# Patient Record
Sex: MALE | Race: BLACK OR AFRICAN AMERICAN | NOT HISPANIC OR LATINO | Employment: FULL TIME | URBAN - METROPOLITAN AREA
[De-identification: names, ages, dates, MRNs, and addresses within clinical notes are randomized per-mention and may not be internally consistent; named-entity substitution may affect disease eponyms.]

---

## 2017-10-10 ENCOUNTER — OFFICE VISIT (OUTPATIENT)
Dept: URGENT CARE | Facility: CLINIC | Age: 37
End: 2017-10-10
Payer: COMMERCIAL

## 2017-10-10 VITALS
TEMPERATURE: 98.8 F | HEART RATE: 82 BPM | DIASTOLIC BLOOD PRESSURE: 88 MMHG | BODY MASS INDEX: 28.06 KG/M2 | WEIGHT: 207.2 LBS | HEIGHT: 72 IN | RESPIRATION RATE: 20 BRPM | SYSTOLIC BLOOD PRESSURE: 126 MMHG | OXYGEN SATURATION: 96 %

## 2017-10-10 DIAGNOSIS — J02.9 ACUTE VIRAL PHARYNGITIS: ICD-10-CM

## 2017-10-10 LAB
INT CON NEG: NEGATIVE
INT CON POS: POSITIVE
S PYO AG THROAT QL: NEGATIVE

## 2017-10-10 PROCEDURE — 99213 OFFICE O/P EST LOW 20 MIN: CPT | Performed by: PHYSICIAN ASSISTANT

## 2017-10-10 PROCEDURE — 87880 STREP A ASSAY W/OPTIC: CPT | Performed by: PHYSICIAN ASSISTANT

## 2017-10-10 NOTE — PROGRESS NOTES
Chief Complaint   Patient presents with   • Pharyngitis     progressed over the last few hours       HISTORY OF PRESENT ILLNESS: Patient is a 37 y.o. male who presents today for about 8 hours of sore throat, worse in the last 2 hours.    He started having some runny nose/nasal congestion.  He had a bit of cough yesterday. Woke up with sore throat that progressed.  Both sides of throat hurting, all the way down.  Felt aching and slightly feverish. He has not taken anything, been trying to drink water.  No one sick at home.      There are no active problems to display for this patient.      Allergies:Review of patient's allergies indicates no known allergies.    No current "[facility]"-ordered outpatient prescriptions on file.     No current "[facility] [facility]"-ordered facility-administered medications on file.        Past Medical History:   Diagnosis Date   • Hypertension        Social History   Substance Use Topics   • Smoking status: Never Smoker   • Smokeless tobacco: Never Used   • Alcohol use 0.6 oz/week     1 Glasses of wine per week      Comment: once a week       No family status information on file.   History reviewed. No pertinent family history.    ROS:  Review of Systems   Constitutional: SEE HPI  HENT: SEE HPI  Eyes: Negative for blurred vision.   Respiratory: SEE HPI  Cardiovascular: Negative for chest pain, palpitations, orthopnea and leg swelling.   Gastrointestinal: Negative for heartburn, nausea, vomiting and abdominal pain.   All other systems reviewed and are negative.       Exam:  Blood pressure 126/88, pulse 82, temperature 37.1 °C (98.8 °F), resp. rate 20, height 1.829 m (6'), weight 94 kg (207 lb 3.2 oz), SpO2 96 %.  General:  Well nourished, well developed male in NAD  Eyes: PERRLA, EOM within normal limits, no conjunctival injection, no scleral icterus, visual fields and acuity grossly intact.  Ears: Normal shape and symmetry, no tenderness, no discharge. External canals are without any significant edema or  erythema. Tympanic membranes are without any inflammation, no effusion. Gross auditory acuity is intact  Nose: Symmetrical, sinuses without tenderness, no discharge.   Mouth: reasonable hygiene, mild diffuse erythema without exudates or tonsillar enlargement.  Neck: no masses, range of motion within normal limits, no tracheal deviation. No lymphadenopathy  Pulmonary: Normal respiratory effort, no wheezes, crackles, or rhonchi.  Cardiovascular: regular rate and rhythm without murmurs, rubs, or gallops.  Abdomen: Soft, nontender, nondistended. Normal bowel sounds. No hepatosplenomegaly or masses, or hernias. No rebound or guarding.  Skin: No visible rashes or lesion. Warm, pink, dry.   Extremities: no clubbing, cyanosis, or edema.  Neuro: A&O x 3. Speech normal/clear.  Normal gait.         Assessment/Plan:  1. Acute viral pharyngitis  POCT Rapid Strep A       -strep negative.   -discussed that I felt this was viral in nature. Did not see any evidence of a bacterial process. Discussed natural progression and sx care.  -fluids, rest emphasized.  -salt gargles, Tylenol/Motrin alternating for pain/fevers.   -humidifier/steam inhalations.      Supportive care, differential diagnoses, and indications for immediate follow-up discussed with patient.   Pathogenesis of diagnosis discussed including typical length and natural progression.   Instructed to return to clinic or nearest emergency department for any change in condition, further concerns, or worsening of symptoms.  Patient states understanding of the plan of care and discharge instructions.      Sally Dick P.A.-C.

## 2019-11-27 ENCOUNTER — OFFICE VISIT (OUTPATIENT)
Dept: URGENT CARE | Facility: PHYSICIAN GROUP | Age: 39
End: 2019-11-27
Payer: COMMERCIAL

## 2019-11-27 ENCOUNTER — APPOINTMENT (OUTPATIENT)
Dept: RADIOLOGY | Facility: IMAGING CENTER | Age: 39
End: 2019-11-27
Attending: NURSE PRACTITIONER
Payer: COMMERCIAL

## 2019-11-27 VITALS
BODY MASS INDEX: 27.96 KG/M2 | SYSTOLIC BLOOD PRESSURE: 110 MMHG | HEART RATE: 72 BPM | TEMPERATURE: 98.9 F | DIASTOLIC BLOOD PRESSURE: 70 MMHG | OXYGEN SATURATION: 96 % | WEIGHT: 206.4 LBS | RESPIRATION RATE: 16 BRPM | HEIGHT: 72 IN

## 2019-11-27 DIAGNOSIS — R05.9 COUGH: ICD-10-CM

## 2019-11-27 DIAGNOSIS — J40 BRONCHITIS: ICD-10-CM

## 2019-11-27 PROCEDURE — 94760 N-INVAS EAR/PLS OXIMETRY 1: CPT | Performed by: NURSE PRACTITIONER

## 2019-11-27 PROCEDURE — 99214 OFFICE O/P EST MOD 30 MIN: CPT | Mod: 25 | Performed by: NURSE PRACTITIONER

## 2019-11-27 PROCEDURE — 71046 X-RAY EXAM CHEST 2 VIEWS: CPT | Mod: TC | Performed by: NURSE PRACTITIONER

## 2019-11-27 PROCEDURE — 94640 AIRWAY INHALATION TREATMENT: CPT | Performed by: NURSE PRACTITIONER

## 2019-11-27 RX ORDER — ALBUTEROL SULFATE 90 UG/1
2 AEROSOL, METERED RESPIRATORY (INHALATION) EVERY 6 HOURS PRN
Qty: 8.5 G | Refills: 0 | Status: SHIPPED | OUTPATIENT
Start: 2019-11-27

## 2019-11-27 RX ORDER — ALBUTEROL SULFATE 2.5 MG/3ML
2.5 SOLUTION RESPIRATORY (INHALATION) ONCE
Status: COMPLETED | OUTPATIENT
Start: 2019-11-27 | End: 2019-11-27

## 2019-11-27 RX ORDER — BENZONATATE 200 MG/1
200 CAPSULE ORAL 3 TIMES DAILY PRN
Qty: 60 CAP | Refills: 0 | Status: SHIPPED | OUTPATIENT
Start: 2019-11-27

## 2019-11-27 RX ORDER — AZITHROMYCIN 250 MG/1
TABLET, FILM COATED ORAL
Qty: 6 TAB | Refills: 0 | Status: SHIPPED | OUTPATIENT
Start: 2019-11-27

## 2019-11-27 RX ADMIN — ALBUTEROL SULFATE 2.5 MG: 2.5 SOLUTION RESPIRATORY (INHALATION) at 11:55

## 2019-11-27 ASSESSMENT — ENCOUNTER SYMPTOMS
COUGH: 1
CHILLS: 1
WHEEZING: 1
SPUTUM PRODUCTION: 1
SHORTNESS OF BREATH: 1

## 2019-11-27 NOTE — PROGRESS NOTES
Subjective:      Bernardo Aponte is a 39 y.o. male who presents with Cough (was in urgent care 10 days ago in [place] was given steroids, still not feeling better, night sweats, shortness of breath, tested negative for flu, x21 days )    Past Medical History:   Diagnosis Date   • Hypertension      Social History     Socioeconomic History   • Marital status:      Spouse name: Not on file   • Number of children: Not on file   • Years of education: Not on file   • Highest education level: Not on file   Occupational History   • Not on file   Social Needs   • Financial resource strain: Not on file   • Food insecurity:     Worry: Not on file     Inability: Not on file   • Transportation needs:     Medical: Not on file     Non-medical: Not on file   Tobacco Use   • Smoking status: Never Smoker   • Smokeless tobacco: Never Used   Substance and Sexual Activity   • Alcohol use: Yes     Alcohol/week: 0.6 oz     Types: 1 Glasses of wine per week     Comment: once a week   • Drug use: No   • Sexual activity: Not on file   Lifestyle   • Physical activity:     Days per week: Not on file     Minutes per session: Not on file   • Stress: Not on file   Relationships   • Social connections:     Talks on phone: Not on file     Gets together: Not on file     Attends Religion service: Not on file     Active member of club or organization: Not on file     Attends meetings of clubs or organizations: Not on file     Relationship status: Not on file   • Intimate partner violence:     Fear of current or ex partner: Not on file     Emotionally abused: Not on file     Physically abused: Not on file     Forced sexual activity: Not on file   Other Topics Concern   • Not on file   Social History Narrative   • Not on file     No family history on file.    Allergies: Patient has no known allergies.    Patient is 39-year-old male who presented with complaint of cough and shortness of breath.  His symptoms have been ongoing for the last 3 weeks.   Was seen at an urgent care in [place] and was given prescription for prednisone which she states was not helpful.  Denies fever.        HPI    Review of Systems   Constitutional: Positive for chills and malaise/fatigue.   HENT: Positive for congestion.    Respiratory: Positive for cough, sputum production, shortness of breath and wheezing.    Skin: Negative.    All other systems reviewed and are negative.         Objective:     /70 (BP Location: Left arm, Patient Position: Sitting, BP Cuff Size: Adult)   Pulse 72   Temp 37.2 °C (98.9 °F) (Temporal)   Resp 16   Ht 1.829 m (6')   Wt 93.6 kg (206 lb 6.4 oz)   SpO2 96%   BMI 27.99 kg/m²      Physical Exam  Vitals signs and nursing note reviewed.   Constitutional:       Appearance: Normal appearance.   HENT:      Head: Normocephalic.      Right Ear: Tympanic membrane, ear canal and external ear normal.      Left Ear: Tympanic membrane, ear canal and external ear normal.      Nose: Nose normal.      Mouth/Throat:      Mouth: Mucous membranes are moist.   Eyes:      Extraocular Movements: Extraocular movements intact.      Conjunctiva/sclera: Conjunctivae normal.      Pupils: Pupils are equal, round, and reactive to light.   Neck:      Musculoskeletal: Normal range of motion and neck supple.   Cardiovascular:      Rate and Rhythm: Normal rate and regular rhythm.   Pulmonary:      Effort: Pulmonary effort is normal.      Breath sounds: Rhonchi present.      Comments: Few scattered rhonchi  Musculoskeletal: Normal range of motion.   Skin:     General: Skin is warm and dry.      Capillary Refill: Capillary refill takes less than 2 seconds.   Neurological:      Mental Status: He is alert and oriented to person, place, and time.   Psychiatric:         Mood and Affect: Mood normal.         Behavior: Behavior normal.         Thought Content: Thought content normal.         Judgment: Judgment normal.               11/27/2019 11:59 AM     HISTORY/REASON FOR EXAM:   Cough.  Cough and shortness of breath for 3 weeks     TECHNIQUE/EXAM DESCRIPTION AND NUMBER OF VIEWS:  Two views of the chest.     COMPARISON:  None.     FINDINGS:  The soft tissues and bony structures are unremarkable.     The heart and mediastinal structures are within normal limits.     Pulmonary vascularity is normal.     The lung fields are clear.     There is no effusion or pneumothorax.     IMPRESSION:     No acute cardiopulmonary disease evident.  Assessment/Plan:     Bronchitis, unresolved    Albuterol by hand-held nebulizer given in office  Albuterol inhaler  Prednisone  Zithromax  Follow-up with primary care provider  ER precautions for respiratory distress  There are no diagnoses linked to this encounter.

## 2021-11-07 ENCOUNTER — OFFICE VISIT (OUTPATIENT)
Dept: URGENT CARE | Facility: PHYSICIAN GROUP | Age: 41
End: 2021-11-07
Payer: COMMERCIAL

## 2021-11-07 VITALS
RESPIRATION RATE: 18 BRPM | OXYGEN SATURATION: 98 % | WEIGHT: 210 LBS | HEART RATE: 88 BPM | HEIGHT: 72 IN | TEMPERATURE: 99.5 F | BODY MASS INDEX: 28.44 KG/M2 | SYSTOLIC BLOOD PRESSURE: 128 MMHG | DIASTOLIC BLOOD PRESSURE: 72 MMHG

## 2021-11-07 DIAGNOSIS — S61.311D LACERATION OF LEFT INDEX FINGER WITHOUT FOREIGN BODY WITH DAMAGE TO NAIL, SUBSEQUENT ENCOUNTER: ICD-10-CM

## 2021-11-07 DIAGNOSIS — Z48.02 VISIT FOR SUTURE REMOVAL: ICD-10-CM

## 2021-11-07 PROCEDURE — 99212 OFFICE O/P EST SF 10 MIN: CPT | Performed by: NURSE PRACTITIONER

## 2021-11-07 ASSESSMENT — ENCOUNTER SYMPTOMS
CONSTITUTIONAL NEGATIVE: 1
MUSCULOSKELETAL NEGATIVE: 1
FEVER: 0
NEUROLOGICAL NEGATIVE: 1

## 2021-11-07 ASSESSMENT — VISUAL ACUITY: OU: 1

## 2021-11-07 NOTE — PROGRESS NOTES
Subjective:     Bernardo Aponte is a 41 y.o. male who presents for Suture / Staple Removal (left hand,index finger,2 stitches,x16 days.)       Suture / Staple Removal  There has been no drainage from the wound. There is no redness present. There is no swelling present. There is no pain present.      Patient reports that about 14 days ago, he went to [facility] and had laceration of the left index finger repaired with sutures there.  A combination of nylon sutures and absorbable sutures were used.  Laceration involved the lateral nail fold and nail bed through the nail plate.  Followed up there a few days ago and had the nylon sutures removed.  Comes into urgent care today for removal of the absorbable sutures that holds the nail plate and nailbed together.  Has been performing wound care.  CMS intact.  Denies other/new symptoms.    Patient was screened prior to rooming and denied COVID-19 diagnosis or contact with a person who has been diagnosed or is suspected to have COVID-19. During this visit, appropriate PPE was worn, hand hygiene was performed, and the patient and any visitors were masked.     PMH:  has a past medical history of Hypertension.    MEDS:   Current Outpatient Medications:   •  albuterol 108 (90 Base) MCG/ACT Aero Soln inhalation aerosol, Inhale 2 Puffs by mouth every 6 hours as needed for Shortness of Breath. (Patient not taking: Reported on 11/7/2021), Disp: 8.5 g, Rfl: 0  •  azithromycin (ZITHROMAX) 250 MG Tab, Take 2 pills by mouth on day one, take 1 pill by mouth on days 2-5 (Patient not taking: Reported on 11/7/2021), Disp: 6 Tab, Rfl: 0  •  benzonatate (TESSALON) 200 MG capsule, Take 1 Cap by mouth 3 times a day as needed for Cough. (Patient not taking: Reported on 11/7/2021), Disp: 60 Cap, Rfl: 0    ALLERGIES: No Known Allergies    SURGHX:   Past Surgical History:   Procedure Laterality Date   • [id number]       SOCHX:  reports that he has never smoked. He has never used smokeless  tobacco. He reports current alcohol use of about 0.6 oz of alcohol per week. He reports that he does not use drugs.     FH: Reviewed with patient, not pertinent to this visit.    Review of Systems   Constitutional: Negative.  Negative for fever.   Musculoskeletal: Negative.    Skin:        Left index finger laceration with sutures in place   Neurological: Negative.    All other systems reviewed and are negative.    Additional details per HPI.      Objective:     /72 (BP Location: Right arm, Patient Position: Sitting, BP Cuff Size: Adult)   Pulse 88   Temp 37.5 °C (99.5 °F) (Temporal)   Resp 18   Ht 1.829 m (6')   Wt 95.3 kg (210 lb)   SpO2 98%   BMI 28.48 kg/m²     Physical Exam  Vitals reviewed.   Constitutional:       General: He is not in acute distress.     Appearance: He is well-developed. He is not ill-appearing or toxic-appearing.   HENT:      Right Ear: External ear normal.      Left Ear: External ear normal.   Eyes:      General: Vision grossly intact.      Extraocular Movements: Extraocular movements intact.      Conjunctiva/sclera: Conjunctivae normal.   Cardiovascular:      Rate and Rhythm: Normal rate.   Pulmonary:      Effort: Pulmonary effort is normal. No respiratory distress.   Musculoskeletal:         General: No deformity. Normal range of motion.      Left hand: Laceration present. No swelling, deformity or tenderness. Normal range of motion. Normal strength. Normal sensation. Normal capillary refill.      Cervical back: Normal range of motion.      Comments: Approx 2 cm laceration involving lateral nail fold of left index finger, nail plate, and nailbed, healing well, wound edges well approximated, 2 absorbable sutures in place, no surrounding erythema, edema, warmth, or discharge, CMS intact   Skin:     General: Skin is warm and dry.      Capillary Refill: Capillary refill takes less than 2 seconds.      Coloration: Skin is not pale.      Findings: No bruising or erythema.    Neurological:      Mental Status: He is alert and oriented to person, place, and time.      Sensory: No sensory deficit.      Motor: No weakness.   Psychiatric:         Behavior: Behavior normal. Behavior is cooperative.       Assessment/Plan:     1. Visit for suture removal    2. Laceration of left index finger without foreign body with damage to nail, subsequent encounter    Wound care performed.  A total of 2 interrupted, absorbable sutures were removed.  Patient tolerated well.  No complications.    Advised to continue with basic wound care until laceration fully heals.    Differential diagnosis, natural history, supportive care, over-the-counter symptom management per 's instructions, close monitoring, and indications for immediate follow-up discussed.     All questions answered. Patient agrees with the plan of care.

## 2022-01-19 ENCOUNTER — HOSPITAL ENCOUNTER (OUTPATIENT)
Facility: MEDICAL CENTER | Age: 42
End: 2022-01-19
Attending: STUDENT IN AN ORGANIZED HEALTH CARE EDUCATION/TRAINING PROGRAM
Payer: COMMERCIAL

## 2022-01-19 ENCOUNTER — OFFICE VISIT (OUTPATIENT)
Dept: URGENT CARE | Facility: CLINIC | Age: 42
End: 2022-01-19
Payer: COMMERCIAL

## 2022-01-19 VITALS
WEIGHT: 203 LBS | HEIGHT: 72 IN | HEART RATE: 104 BPM | OXYGEN SATURATION: 96 % | BODY MASS INDEX: 27.5 KG/M2 | RESPIRATION RATE: 18 BRPM | SYSTOLIC BLOOD PRESSURE: 134 MMHG | DIASTOLIC BLOOD PRESSURE: 68 MMHG | TEMPERATURE: 99.3 F

## 2022-01-19 DIAGNOSIS — Z20.822 COVID-19 RULED OUT: ICD-10-CM

## 2022-01-19 DIAGNOSIS — R11.0 NAUSEA: ICD-10-CM

## 2022-01-19 DIAGNOSIS — J01.90 ACUTE BACTERIAL RHINOSINUSITIS: ICD-10-CM

## 2022-01-19 DIAGNOSIS — B96.89 ACUTE BACTERIAL RHINOSINUSITIS: ICD-10-CM

## 2022-01-19 PROCEDURE — U0005 INFEC AGEN DETEC AMPLI PROBE: HCPCS

## 2022-01-19 PROCEDURE — U0003 INFECTIOUS AGENT DETECTION BY NUCLEIC ACID (DNA OR RNA); SEVERE ACUTE RESPIRATORY SYNDROME CORONAVIRUS 2 (SARS-COV-2) (CORONAVIRUS DISEASE [COVID-19]), AMPLIFIED PROBE TECHNIQUE, MAKING USE OF HIGH THROUGHPUT TECHNOLOGIES AS DESCRIBED BY CMS-2020-01-R: HCPCS

## 2022-01-19 PROCEDURE — 99214 OFFICE O/P EST MOD 30 MIN: CPT | Mod: CS | Performed by: STUDENT IN AN ORGANIZED HEALTH CARE EDUCATION/TRAINING PROGRAM

## 2022-01-19 RX ORDER — ONDANSETRON 4 MG/1
4 TABLET, ORALLY DISINTEGRATING ORAL EVERY 6 HOURS PRN
Qty: 20 TABLET | Refills: 0 | Status: SHIPPED | OUTPATIENT
Start: 2022-01-19

## 2022-01-19 RX ORDER — AMOXICILLIN AND CLAVULANATE POTASSIUM 875; 125 MG/1; MG/1
1 TABLET, FILM COATED ORAL 2 TIMES DAILY
Qty: 10 TABLET | Refills: 0 | Status: SHIPPED | OUTPATIENT
Start: 2022-01-19 | End: 2022-01-24

## 2022-01-19 NOTE — PROGRESS NOTES
Subjective:   CHIEF COMPLAINT  Chief Complaint   Patient presents with   • Coronavirus Screening     x today positive home covid test, sinus infection, body aches, fatigue, dry cough, stomach pain, dizziness       HPI  Bernardo Aponte is a 41 y.o. male who presents requesting a COVID PCR test.  Patient with positive home test today.  Patient reports he has been experiencing sinus pain and pressure for approximately 1 month.  Then yesterday he developed fatigue, nausea and dry cough with positive home test.  Patient also reports he has been experiencing ear congestion with a sensation of feeling like he is underwater.  He has tried Mucinex D which is helped.  He is also tried Robitussin.  He denies any associated symptoms of wheezing or shortness of breath.  Patient is vaccinated against COVID x3.    REVIEW OF SYSTEMS  General: no fever or chills  GI: no nausea or vomiting  See HPI for further details.     PAST MEDICAL HISTORY  There are no problems to display for this patient.      SURGICAL HISTORY   has a past surgical history that includes [id number].    ALLERGIES  No Known Allergies    CURRENT MEDICATIONS  Home Medications     Reviewed by Jace Falcon D.O. (Physician) on 01/19/22 at 1325  Med List Status: <None>   Medication Last Dose Status   albuterol 108 (90 Base) MCG/ACT Aero Soln inhalation aerosol Not Taking Active   azithromycin (ZITHROMAX) 250 MG Tab Not Taking Active   benzonatate (TESSALON) 200 MG capsule Not Taking Active                SOCIAL HISTORY  Social History     Tobacco Use   • Smoking status: Never Smoker   • Smokeless tobacco: Never Used   Vaping Use   • Vaping Use: Never used   Substance and Sexual Activity   • Alcohol use: Not Currently     Alcohol/week: 0.6 oz     Types: 1 Glasses of wine per week     Comment: once a week   • Drug use: No   • Sexual activity: Not on file       FAMILY HISTORY  History reviewed. No pertinent family history.       Objective:   PHYSICAL EXAM  VITAL SIGNS:  /68   Pulse (!) 104   Temp 37.4 °C (99.3 °F) (Temporal)   Resp 18   Ht 1.829 m (6')   Wt 92.1 kg (203 lb)   SpO2 96%   BMI 27.53 kg/m²     Gen: no acute distress, normal voice  Skin: dry, intact, moist mucosal membranes  ENT: Mildly erythematous right TM external canal without any debris or edema.  Left TM intact without any erythema or bulging.  Lungs: CTAB w/ symmetric expansion  CV: RRR w/o murmurs or clicks  Psych: normal affect, normal judgement, alert, awake    Assessment/Plan:     1. Acute bacterial rhinosinusitis  amoxicillin-clavulanate (AUGMENTIN) 875-125 MG Tab   2. COVID-19 ruled out  COVID/SARS CoV-2 PCR   3. Nausea  ondansetron (ZOFRAN ODT) 4 MG TABLET DISPERSIBLE   Sinus pain and pressure for 1 month consistent with a bacterial sinus infection.  Then yesterday patient developed nausea, dry cough with home positive test consistent with a superimposed COVID-19 infection.  Patient is vaccinated x3  - Ordered Augmentin for sinus infection (symptoms preceded COVID infection)  - Ordered Rx for Zofran to help with nausea  - NeilMed sinus rinses, Flonase and Zyrtec  -Ordered Covid.  Results will be sent through [employer]  -Instructed to quarantine until Covid results have been returned  -Encouraged hydration and symptomatic treatment with Tylenol/ibuprofen prn  -Discussed red flags and return precautions.  Patient verbalized their understanding.  All questions were answered.    Differential diagnosis, natural history, supportive care, and indications for immediate follow-up discussed. Patient agrees with the plan of care.    Follow-up as needed if symptoms worsen or fail to improve to PCP, Urgent care or Emergency Room.       Please note that this dictation was created using voice recognition software. I have made a reasonable attempt to correct obvious errors, but I expect that there are errors of grammar and possibly content that I did not discover before finalizing the note.

## 2022-01-20 DIAGNOSIS — Z20.822 COVID-19 RULED OUT: ICD-10-CM

## 2022-01-20 LAB — COVID ORDER STATUS COVID19: NORMAL

## 2022-01-21 LAB
SARS-COV-2 RNA RESP QL NAA+PROBE: DETECTED
SPECIMEN SOURCE: ABNORMAL

## 2024-07-19 ENCOUNTER — HOSPITAL ENCOUNTER (EMERGENCY)
Facility: MEDICAL CENTER | Age: 44
End: 2024-07-19
Attending: EMERGENCY MEDICINE
Payer: COMMERCIAL

## 2024-07-19 ENCOUNTER — APPOINTMENT (OUTPATIENT)
Dept: RADIOLOGY | Facility: MEDICAL CENTER | Age: 44
End: 2024-07-19
Attending: EMERGENCY MEDICINE
Payer: COMMERCIAL

## 2024-07-19 VITALS
HEART RATE: 68 BPM | SYSTOLIC BLOOD PRESSURE: 130 MMHG | OXYGEN SATURATION: 93 % | TEMPERATURE: 97.8 F | RESPIRATION RATE: 16 BRPM | HEIGHT: 72 IN | BODY MASS INDEX: 29.47 KG/M2 | DIASTOLIC BLOOD PRESSURE: 98 MMHG | WEIGHT: 217.59 LBS

## 2024-07-19 DIAGNOSIS — Z86.79 HISTORY OF HYPERTENSION: ICD-10-CM

## 2024-07-19 DIAGNOSIS — R07.89 ATYPICAL CHEST PAIN: ICD-10-CM

## 2024-07-19 LAB
ALBUMIN SERPL BCP-MCNC: 4.6 G/DL (ref 3.2–4.9)
ALBUMIN/GLOB SERPL: 1.7 G/DL
ALP SERPL-CCNC: 56 U/L (ref 30–99)
ALT SERPL-CCNC: 36 U/L (ref 2–50)
ANION GAP SERPL CALC-SCNC: 13 MMOL/L (ref 7–16)
AST SERPL-CCNC: 28 U/L (ref 12–45)
BASOPHILS # BLD AUTO: 0.6 % (ref 0–1.8)
BASOPHILS # BLD: 0.04 K/UL (ref 0–0.12)
BILIRUB SERPL-MCNC: 0.6 MG/DL (ref 0.1–1.5)
BUN SERPL-MCNC: 14 MG/DL (ref 8–22)
CALCIUM ALBUM COR SERPL-MCNC: 9.7 MG/DL (ref 8.5–10.5)
CALCIUM SERPL-MCNC: 10.2 MG/DL (ref 8.5–10.5)
CHLORIDE SERPL-SCNC: 103 MMOL/L (ref 96–112)
CO2 SERPL-SCNC: 23 MMOL/L (ref 20–33)
CREAT SERPL-MCNC: 0.98 MG/DL (ref 0.5–1.4)
EKG IMPRESSION: NORMAL
EOSINOPHIL # BLD AUTO: 0.16 K/UL (ref 0–0.51)
EOSINOPHIL NFR BLD: 2.4 % (ref 0–6.9)
ERYTHROCYTE [DISTWIDTH] IN BLOOD BY AUTOMATED COUNT: 40.2 FL (ref 35.9–50)
GFR SERPLBLD CREATININE-BSD FMLA CKD-EPI: 99 ML/MIN/1.73 M 2
GLOBULIN SER CALC-MCNC: 2.7 G/DL (ref 1.9–3.5)
GLUCOSE SERPL-MCNC: 95 MG/DL (ref 65–99)
HCT VFR BLD AUTO: 45.4 % (ref 42–52)
HGB BLD-MCNC: 16.3 G/DL (ref 14–18)
IMM GRANULOCYTES # BLD AUTO: 0.02 K/UL (ref 0–0.11)
IMM GRANULOCYTES NFR BLD AUTO: 0.3 % (ref 0–0.9)
LYMPHOCYTES # BLD AUTO: 2.86 K/UL (ref 1–4.8)
LYMPHOCYTES NFR BLD: 43 % (ref 22–41)
MCH RBC QN AUTO: 32.3 PG (ref 27–33)
MCHC RBC AUTO-ENTMCNC: 35.9 G/DL (ref 32.3–36.5)
MCV RBC AUTO: 90.1 FL (ref 81.4–97.8)
MONOCYTES # BLD AUTO: 0.5 K/UL (ref 0–0.85)
MONOCYTES NFR BLD AUTO: 7.5 % (ref 0–13.4)
NEUTROPHILS # BLD AUTO: 3.07 K/UL (ref 1.82–7.42)
NEUTROPHILS NFR BLD: 46.2 % (ref 44–72)
NRBC # BLD AUTO: 0 K/UL
NRBC BLD-RTO: 0 /100 WBC (ref 0–0.2)
PLATELET # BLD AUTO: 258 K/UL (ref 164–446)
PMV BLD AUTO: 11.2 FL (ref 9–12.9)
POTASSIUM SERPL-SCNC: 4.6 MMOL/L (ref 3.6–5.5)
PROT SERPL-MCNC: 7.3 G/DL (ref 6–8.2)
RBC # BLD AUTO: 5.04 M/UL (ref 4.7–6.1)
SODIUM SERPL-SCNC: 139 MMOL/L (ref 135–145)
TROPONIN T SERPL-MCNC: <6 NG/L (ref 6–19)
WBC # BLD AUTO: 6.7 K/UL (ref 4.8–10.8)

## 2024-07-19 PROCEDURE — 85025 COMPLETE CBC W/AUTO DIFF WBC: CPT

## 2024-07-19 PROCEDURE — 93005 ELECTROCARDIOGRAM TRACING: CPT | Performed by: EMERGENCY MEDICINE

## 2024-07-19 PROCEDURE — 36415 COLL VENOUS BLD VENIPUNCTURE: CPT

## 2024-07-19 PROCEDURE — 71045 X-RAY EXAM CHEST 1 VIEW: CPT

## 2024-07-19 PROCEDURE — 80053 COMPREHEN METABOLIC PANEL: CPT

## 2024-07-19 PROCEDURE — 99284 EMERGENCY DEPT VISIT MOD MDM: CPT

## 2024-07-19 PROCEDURE — 93005 ELECTROCARDIOGRAM TRACING: CPT

## 2024-07-19 PROCEDURE — 84484 ASSAY OF TROPONIN QUANT: CPT

## 2024-07-19 ASSESSMENT — HEART SCORE
TROPONIN: LESS THAN OR EQUAL TO NORMAL LIMIT
RISK FACTORS: 1-2 RISK FACTORS
HEART SCORE: 1
ECG: NORMAL
HISTORY: SLIGHTLY SUSPICIOUS
AGE: <45

## 2024-08-05 ENCOUNTER — TELEPHONE (OUTPATIENT)
Dept: SCHEDULING | Facility: IMAGING CENTER | Age: 44
End: 2024-08-05